# Patient Record
Sex: MALE
[De-identification: names, ages, dates, MRNs, and addresses within clinical notes are randomized per-mention and may not be internally consistent; named-entity substitution may affect disease eponyms.]

---

## 2023-03-15 ENCOUNTER — NURSE TRIAGE (OUTPATIENT)
Dept: OTHER | Facility: CLINIC | Age: 73
End: 2023-03-15

## 2023-03-15 NOTE — TELEPHONE ENCOUNTER
Location of patient: [place]    Subjective: Caller states \"Covid\"     Current Symptoms:   Patient c/o \"sinus headache\" x couple of weeks  Daughter tested positive for Covid yesterday and patient babysat granddaughter all weekend  Granddaughter had a runny nose  Patient is fully vaccinated for Covid-19  Denies difficulty breathing, chest pain, or other cold sx  O2 sat is 97% and pulse 85 bpm    Onset: 2 weeks ago    Temperature: Denies    Recommended disposition: [place] [address] advice provided, patient verbalizes understanding; denies any other questions or concerns; instructed to call back for any new or worsening symptoms. This triage is a result of a call to [address] [place] [address] of [employer]. Please do not respond to the triage nurse through this encounter. Any subsequent communication should be directly with the patient.     Reason for Disposition   COVID-19 Testing, questions about    Protocols used: Coronavirus (COVID-19) Diagnosed or Suspected-ADULT-